# Patient Record
Sex: MALE | Race: WHITE | NOT HISPANIC OR LATINO
[De-identification: names, ages, dates, MRNs, and addresses within clinical notes are randomized per-mention and may not be internally consistent; named-entity substitution may affect disease eponyms.]

---

## 2022-03-09 PROBLEM — Z00.00 ENCOUNTER FOR PREVENTIVE HEALTH EXAMINATION: Status: ACTIVE | Noted: 2022-03-09

## 2022-03-15 NOTE — PHYSICAL EXAM
Rest and elevate the affected painful area as much as possible.    Apply ice for 15-20 minutes intermittently as needed and especially after any offending activity.   Use splint/crutches as directed     Anti-inflammatories/pain relievers like tylenol/acetaminophen or ibuprofen can be very helpful if not allergic to or contraindicated.     Follow up with Orthopedic specialist for further evaluation and treatment for ortho to be seen tomorrow        [Well Developed] : well developed [Well Nourished] : well nourished [No Acute Distress] : no acute distress [Normal Conjunctiva] : normal conjunctiva [Normal Venous Pressure] : normal venous pressure [5th Left ICS - MCL] : palpated at the 5th LICS in the midclavicular line [Normal] : normal [No Precordial Heave] : no precordial heave was noted [Normal Rate] : normal [Rhythm Regular] : regular [Normal S1] : normal S1 [Normal S2] : normal S2 [No Murmur] : no murmurs heard [No Pitting Edema] : no pitting edema present [2+] : left 2+ [Clear Lung Fields] : clear lung fields [Good Air Entry] : good air entry [No Respiratory Distress] : no respiratory distress  [Soft] : abdomen soft [Non Tender] : non-tender [Normal Bowel Sounds] : normal bowel sounds [Normal Gait] : normal gait [No Edema] : no edema [No Varicosities] : no varicosities [No Rash] : no rash [Moves all extremities] : moves all extremities [No Focal Deficits] : no focal deficits [Alert and Oriented] : alert and oriented

## 2022-03-16 ENCOUNTER — APPOINTMENT (OUTPATIENT)
Dept: CARDIOLOGY | Facility: CLINIC | Age: 32
End: 2022-03-16
Payer: COMMERCIAL

## 2022-03-16 VITALS — SYSTOLIC BLOOD PRESSURE: 118 MMHG | HEART RATE: 60 BPM | DIASTOLIC BLOOD PRESSURE: 66 MMHG

## 2022-03-16 VITALS — BODY MASS INDEX: 22.62 KG/M2 | WEIGHT: 158 LBS | HEIGHT: 70 IN | TEMPERATURE: 97.4 F

## 2022-03-16 DIAGNOSIS — R55 SYNCOPE AND COLLAPSE: ICD-10-CM

## 2022-03-16 DIAGNOSIS — Z87.891 PERSONAL HISTORY OF NICOTINE DEPENDENCE: ICD-10-CM

## 2022-03-16 DIAGNOSIS — R94.31 ABNORMAL ELECTROCARDIOGRAM [ECG] [EKG]: ICD-10-CM

## 2022-03-16 DIAGNOSIS — Z78.9 OTHER SPECIFIED HEALTH STATUS: ICD-10-CM

## 2022-03-16 DIAGNOSIS — Z82.49 FAMILY HISTORY OF ISCHEMIC HEART DISEASE AND OTHER DISEASES OF THE CIRCULATORY SYSTEM: ICD-10-CM

## 2022-03-16 PROCEDURE — 93000 ELECTROCARDIOGRAM COMPLETE: CPT

## 2022-03-16 PROCEDURE — 99202 OFFICE O/P NEW SF 15 MIN: CPT

## 2022-03-16 NOTE — REASON FOR VISIT
[Other: ____] : [unfilled] [FreeTextEntry1] : Diagnostic Tests:\par --------------------\par EKG:\par 03/16/22 NSR  with elevated J point in inferior leads.\par 03/08/22-NSR. J-point elevation in inferior leads.

## 2022-03-16 NOTE — ASSESSMENT
[FreeTextEntry1] : Syncope: Unclear cause. May be due to dehydration and electrolyte abnormality. \par -Will check stress echo vs TTE and ETT to evaluate for structural heart disease.\par -Will defer MCOT monitor at this time. \par \par BOB on EKG: J-point elevation, likely due to early repolarization\par -Stress echo vs stress and echo. \par -Labs reviewed with normal CRP, likely not due to MI/pericarditis. \par \par Follow up in 3 months.

## 2022-03-16 NOTE — REVIEW OF SYSTEMS
[Negative] : Heme/Lymph [SOB] : no shortness of breath [Dyspnea on exertion] : not dyspnea during exertion [Chest Discomfort] : no chest discomfort [Palpitations] : no palpitations [Syncope] : syncope

## 2022-03-16 NOTE — HISTORY OF PRESENT ILLNESS
[FreeTextEntry1] : Mr. Schwartz is a 33yo M with no PMH who presents to establish care. His PMD is Dr. Gerhard Nassar referred th [facility] ot cardiology due to abnormal findings on EKG (elevated J point  Patient reports passing out on Sunday 03/13/2022 while playing computer games, last thing he remembers is trying to stretch out his right leg due to a muscle spasm, recalls a very stressful week at work. He denies chest pain, SOB, dizziness, palpitations. He thinks he was dehydrated.

## 2022-04-15 ENCOUNTER — APPOINTMENT (OUTPATIENT)
Dept: CARDIOLOGY | Facility: CLINIC | Age: 32
End: 2022-04-15

## 2022-06-20 ENCOUNTER — APPOINTMENT (OUTPATIENT)
Dept: CARDIOLOGY | Facility: CLINIC | Age: 32
End: 2022-06-20

## 2022-11-21 ENCOUNTER — EMERGENCY (EMERGENCY)
Facility: HOSPITAL | Age: 32
LOS: 0 days | Discharge: HOME | End: 2022-11-21
Attending: EMERGENCY MEDICINE | Admitting: EMERGENCY MEDICINE

## 2022-11-21 VITALS — DIASTOLIC BLOOD PRESSURE: 76 MMHG | SYSTOLIC BLOOD PRESSURE: 129 MMHG | HEART RATE: 56 BPM | TEMPERATURE: 97 F

## 2022-11-21 VITALS
HEIGHT: 70 IN | WEIGHT: 169.98 LBS | RESPIRATION RATE: 22 BRPM | OXYGEN SATURATION: 99 % | SYSTOLIC BLOOD PRESSURE: 127 MMHG | HEART RATE: 87 BPM | TEMPERATURE: 96 F | DIASTOLIC BLOOD PRESSURE: 73 MMHG

## 2022-11-21 DIAGNOSIS — S91.205A UNSPECIFIED OPEN WOUND OF LEFT LESSER TOE(S) WITH DAMAGE TO NAIL, INITIAL ENCOUNTER: ICD-10-CM

## 2022-11-21 DIAGNOSIS — Z20.822 CONTACT WITH AND (SUSPECTED) EXPOSURE TO COVID-19: ICD-10-CM

## 2022-11-21 DIAGNOSIS — S92.812A OTHER FRACTURE OF LEFT FOOT, INITIAL ENCOUNTER FOR CLOSED FRACTURE: ICD-10-CM
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
## 2022-11-21 DIAGNOSIS — S90.222A CONTUSION OF LEFT LESSER TOE(S) WITH DAMAGE TO NAIL, INITIAL ENCOUNTER: ICD-10-CM

## 2022-11-21 DIAGNOSIS — S91.312A LACERATION WITHOUT FOREIGN BODY, LEFT FOOT, INITIAL ENCOUNTER: ICD-10-CM

## 2022-11-21 DIAGNOSIS — F17.290 NICOTINE DEPENDENCE, OTHER TOBACCO PRODUCT, UNCOMPLICATED: ICD-10-CM

## 2022-11-21 DIAGNOSIS — M25.531 PAIN IN RIGHT WRIST: ICD-10-CM
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
## 2022-11-21 DIAGNOSIS — S90.212A CONTUSION OF LEFT GREAT TOE WITH DAMAGE TO NAIL, INITIAL ENCOUNTER: ICD-10-CM

## 2022-11-21 LAB
ALBUMIN SERPL ELPH-MCNC: 5.1 G/DL — SIGNIFICANT CHANGE UP (ref 3.5–5.2)
ALP SERPL-CCNC: 60 U/L — SIGNIFICANT CHANGE UP (ref 30–115)
ALT FLD-CCNC: 16 U/L — SIGNIFICANT CHANGE UP (ref 0–41)
ANION GAP SERPL CALC-SCNC: 15 MMOL/L — HIGH (ref 7–14)
APTT BLD: 29 SEC — SIGNIFICANT CHANGE UP (ref 27–39.2)
AST SERPL-CCNC: 20 U/L — SIGNIFICANT CHANGE UP (ref 0–41)
BASOPHILS # BLD AUTO: 0.06 K/UL — SIGNIFICANT CHANGE UP (ref 0–0.2)
BASOPHILS NFR BLD AUTO: 0.6 % — SIGNIFICANT CHANGE UP (ref 0–1)
BILIRUB SERPL-MCNC: 0.7 MG/DL — SIGNIFICANT CHANGE UP (ref 0.2–1.2)
BLD GP AB SCN SERPL QL: SIGNIFICANT CHANGE UP
BUN SERPL-MCNC: 17 MG/DL — SIGNIFICANT CHANGE UP (ref 10–20)
CALCIUM SERPL-MCNC: 10 MG/DL — SIGNIFICANT CHANGE UP (ref 8.4–10.5)
CHLORIDE SERPL-SCNC: 101 MMOL/L — SIGNIFICANT CHANGE UP (ref 98–110)
CO2 SERPL-SCNC: 24 MMOL/L — SIGNIFICANT CHANGE UP (ref 17–32)
CREAT SERPL-MCNC: 0.9 MG/DL — SIGNIFICANT CHANGE UP (ref 0.7–1.5)
EGFR: 117 ML/MIN/1.73M2 — SIGNIFICANT CHANGE UP
EOSINOPHIL # BLD AUTO: 0.24 K/UL — SIGNIFICANT CHANGE UP (ref 0–0.7)
EOSINOPHIL NFR BLD AUTO: 2.6 % — SIGNIFICANT CHANGE UP (ref 0–8)
GLUCOSE SERPL-MCNC: 139 MG/DL — HIGH (ref 70–99)
HCT VFR BLD CALC: 45.9 % — SIGNIFICANT CHANGE UP (ref 42–52)
HGB BLD-MCNC: 16.1 G/DL — SIGNIFICANT CHANGE UP (ref 14–18)
IMM GRANULOCYTES NFR BLD AUTO: 0.2 % — SIGNIFICANT CHANGE UP (ref 0.1–0.3)
INR BLD: 0.93 RATIO — SIGNIFICANT CHANGE UP (ref 0.65–1.3)
LYMPHOCYTES # BLD AUTO: 3.55 K/UL — HIGH (ref 1.2–3.4)
LYMPHOCYTES # BLD AUTO: 37.7 % — SIGNIFICANT CHANGE UP (ref 20.5–51.1)
MCHC RBC-ENTMCNC: 31.2 PG — HIGH (ref 27–31)
MCHC RBC-ENTMCNC: 35.1 G/DL — SIGNIFICANT CHANGE UP (ref 32–37)
MCV RBC AUTO: 89 FL — SIGNIFICANT CHANGE UP (ref 80–94)
MONOCYTES # BLD AUTO: 0.62 K/UL — HIGH (ref 0.1–0.6)
MONOCYTES NFR BLD AUTO: 6.6 % — SIGNIFICANT CHANGE UP (ref 1.7–9.3)
NEUTROPHILS # BLD AUTO: 4.92 K/UL — SIGNIFICANT CHANGE UP (ref 1.4–6.5)
NEUTROPHILS NFR BLD AUTO: 52.3 % — SIGNIFICANT CHANGE UP (ref 42.2–75.2)
NRBC # BLD: 0 /100 WBCS — SIGNIFICANT CHANGE UP (ref 0–0)
PLATELET # BLD AUTO: 259 K/UL — SIGNIFICANT CHANGE UP (ref 130–400)
POTASSIUM SERPL-MCNC: 4.9 MMOL/L — SIGNIFICANT CHANGE UP (ref 3.5–5)
POTASSIUM SERPL-SCNC: 4.9 MMOL/L — SIGNIFICANT CHANGE UP (ref 3.5–5)
PROT SERPL-MCNC: 7 G/DL — SIGNIFICANT CHANGE UP (ref 6–8)
PROTHROM AB SERPL-ACNC: 10.6 SEC — SIGNIFICANT CHANGE UP (ref 9.95–12.87)
RBC # BLD: 5.16 M/UL — SIGNIFICANT CHANGE UP (ref 4.7–6.1)
RBC # FLD: 11.4 % — LOW (ref 11.5–14.5)
SARS-COV-2 RNA SPEC QL NAA+PROBE: SIGNIFICANT CHANGE UP
SODIUM SERPL-SCNC: 140 MMOL/L — SIGNIFICANT CHANGE UP (ref 135–146)
WBC # BLD: 9.41 K/UL — SIGNIFICANT CHANGE UP (ref 4.8–10.8)
WBC # FLD AUTO: 9.41 K/UL — SIGNIFICANT CHANGE UP (ref 4.8–10.8)

## 2022-11-21 PROCEDURE — 73610 X-RAY EXAM OF ANKLE: CPT | Mod: 26,LT

## 2022-11-21 PROCEDURE — 29515 APPLICATION SHORT LEG SPLINT: CPT | Mod: GC,LT,59

## 2022-11-21 PROCEDURE — 11730 AVULSION NAIL PLATE SIMPLE 1: CPT | Mod: GC,LT,59

## 2022-11-21 PROCEDURE — 11740 EVACUATION SUBUNGUAL HMTMA: CPT | Mod: GC,LT,59

## 2022-11-21 PROCEDURE — 99284 EMERGENCY DEPT VISIT MOD MDM: CPT | Mod: GC,25

## 2022-11-21 PROCEDURE — 73700 CT LOWER EXTREMITY W/O DYE: CPT | Mod: 26,LT,MA,76

## 2022-11-21 PROCEDURE — 73630 X-RAY EXAM OF FOOT: CPT | Mod: 26,LT

## 2022-11-21 PROCEDURE — 12042 INTMD RPR N-HF/GENIT2.6-7.5: CPT | Mod: LT,59

## 2022-11-21 PROCEDURE — 11042 DBRDMT SUBQ TIS 1ST 20SQCM/<: CPT | Mod: LT,59,GC

## 2022-11-21 PROCEDURE — 73110 X-RAY EXAM OF WRIST: CPT | Mod: 26,RT

## 2022-11-21 PROCEDURE — 99285 EMERGENCY DEPT VISIT HI MDM: CPT

## 2022-11-21 PROCEDURE — 99053 MED SERV 10PM-8AM 24 HR FAC: CPT

## 2022-11-21 RX ORDER — MORPHINE SULFATE 50 MG/1
4 CAPSULE, EXTENDED RELEASE ORAL ONCE
Refills: 0 | Status: DISCONTINUED | OUTPATIENT
Start: 2022-11-21 | End: 2022-11-21

## 2022-11-21 RX ORDER — SODIUM CHLORIDE 9 MG/ML
1000 INJECTION, SOLUTION INTRAVENOUS ONCE
Refills: 0 | Status: COMPLETED | OUTPATIENT
Start: 2022-11-21 | End: 2022-11-21

## 2022-11-21 RX ORDER — ONDANSETRON 8 MG/1
4 TABLET, FILM COATED ORAL ONCE
Refills: 0 | Status: COMPLETED | OUTPATIENT
Start: 2022-11-21 | End: 2022-11-21

## 2022-11-21 RX ORDER — OXYCODONE AND ACETAMINOPHEN 5; 325 MG/1; MG/1
1 TABLET ORAL
Qty: 12 | Refills: 0
Start: 2022-11-21 | End: 2022-11-23

## 2022-11-21 RX ORDER — CEFAZOLIN SODIUM 1 G
2000 VIAL (EA) INJECTION ONCE
Refills: 0 | Status: COMPLETED | OUTPATIENT
Start: 2022-11-21 | End: 2022-11-21

## 2022-11-21 RX ADMIN — MORPHINE SULFATE 4 MILLIGRAM(S): 50 CAPSULE, EXTENDED RELEASE ORAL at 08:00

## 2022-11-21 RX ADMIN — MORPHINE SULFATE 4 MILLIGRAM(S): 50 CAPSULE, EXTENDED RELEASE ORAL at 06:41

## 2022-11-21 RX ADMIN — MORPHINE SULFATE 4 MILLIGRAM(S): 50 CAPSULE, EXTENDED RELEASE ORAL at 10:10

## 2022-11-21 RX ADMIN — Medication 100 MILLIGRAM(S): at 06:52

## 2022-11-21 RX ADMIN — MORPHINE SULFATE 4 MILLIGRAM(S): 50 CAPSULE, EXTENDED RELEASE ORAL at 07:11

## 2022-11-21 RX ADMIN — MORPHINE SULFATE 4 MILLIGRAM(S): 50 CAPSULE, EXTENDED RELEASE ORAL at 07:28

## 2022-11-21 RX ADMIN — SODIUM CHLORIDE 1000 MILLILITER(S): 9 INJECTION, SOLUTION INTRAVENOUS at 06:41

## 2022-11-21 RX ADMIN — ONDANSETRON 4 MILLIGRAM(S): 8 TABLET, FILM COATED ORAL at 06:41

## 2022-11-21 NOTE — ED PROVIDER NOTE - PATIENT PORTAL LINK FT
You can access the FollowMyHealth Patient Portal offered by [facility] by registering at the following website: [email or web address]. By joining [employer]’s FollowMyHealth portal, you will also be able to view your health information using other applications (apps) compatible with our system.

## 2022-11-21 NOTE — CONSULT NOTE ADULT - SUBJECTIVE AND OBJECTIVE BOX
Podiatry Consult Note    Subjective:  CARLOSPHIL  Seen Bedside 32y Male  .   Patient is a 32y old  Male who presents with a chief complaint of     Podiatry:  32 year old male presents with left foot wound and laceration to medial side with active bleeding. Patient works at [employer] were his foot was ran over by a [employer]. Patient states he was wearing a boot with socks and did not remove his shoe until presenting to the ED. Patient is in moderate to severe pain. Denies pain out of proportion, denies paresthesia, and is able to perform ROM to ankle/foot/toes. Denies f/c/n/v/sob.       Past Medical History and Surgical History  PAST MEDICAL & SURGICAL HISTORY:       Review of Systems:  [X] Ten point review of systems is otherwise negative except as noted     Objective:  Vital Signs Last 24 Hrs  T(C): 35.4 (21 Nov 2022 06:18), Max: 35.4 (21 Nov 2022 06:18)  T(F): 95.8 (21 Nov 2022 06:18), Max: 95.8 (21 Nov 2022 06:18)  HR: 57 (21 Nov 2022 09:33) (55 - 87)  BP: 138/80 (21 Nov 2022 09:33) (125/60 - 138/80)  BP(mean): --  RR: 20 (21 Nov 2022 09:33) (19 - 22)  SpO2: 100% (21 Nov 2022 09:33) (99% - 100%)    Parameters below as of 21 Nov 2022 06:18  Patient On (Oxygen Delivery Method): room air                            16.1   9.41  )-----------( 259      ( 21 Nov 2022 07:20 )             45.9                 11-21    140  |  101  |  17  ----------------------------<  139<H>  4.9   |  24  |  0.9    Ca    10.0      21 Nov 2022 07:20    TPro  7.0  /  Alb  5.1  /  TBili  0.7  /  DBili  x   /  AST  20  /  ALT  16  /  AlkPhos  60  11-21        Physical Exam - Lower Extremity Focused:   Derm:    Left foot laceration at level of medial mid foot measuring approximately 6.5cm x 1.5cm x 1.2cm with active moderate bleeding/drainage. Clean wound margins. No evidence of pus, foreign bodies, or dirt.     Subungual hematoma to proximal medial nail fold of hallux nail left foot   Complete lysis of nail of left foot 3rd digit with mild bleeding.    Moderate ecchymosis noted to dorsal forefoot extending proximally to dorsal midfoot.     No evidence of pallor or cyanosis noted;    Vascular: DP and PT Pulses palpable; Foot is Warm to Warm to the touch; Capillary Refill Time < 3 Seconds;    Neuro: Protective Sensation Intact; No paraesthesia, no poikilothermia.  MSK:   Moderate to severe Pain On Palpation at Wound Site   guarded TTP to palpation of Left foot  Able to perform ROM to ankle, STJ, and toes with little to no pain.     Radiology:  CT-Left Foot/Ankle 11/21/22  Left ankle:  No additional fracture. No dislocation. Symmetric ankle mortise. No significant ankle joint effusion.    IMPRESSION:  1. Small avulsion fractures of the dorsal calcaneus and cuboid in the region of the dorsal calcaneocuboid ligament attachment consistent with midtarsal sprain/injury.  2. Small fracture of the anterior plantar calcaneus.  3. Fracture of the first distal phalangeal tuft.  4. Small avulsion fracture at the lateral base of the first proximal phalanx.  5. Subcutaneous gas consistent with open wound.      Assessment:  Laceration to Left foot; clean wound margins;   Multiple fractures sustained to left foot; stable; no displacement/no dislocations/no open fractures  Left foot 3rd digit nail avulsion; stable  Left foot 1st digit nail subungual hematoma; <25% of nail    Plan:  Chart reviewed and Patient evaluated. All Questions and Concerns Addressed and Answered  CT Imaging L  Foot; Results as stated above  Local Wound Care; Wound Flushed w/ 3000mL of a mix: NS, Betadine, H2O2, vashe wash until laceration appeared clean.   Ankle block injection: 17cc of Lidocaine 1% w/o epi;  Removal LF 3rd digit nail with sterile needle ;   Wound closed with 4-0 vicryl and 2-0 nylon;   Posterior splint applied: 4in fiber glass, webrill, Ace bandage  Weight Bearing Status; NWB w/Crutches and posterior splint 4-6 weeks minimum LLE;    Recommend PO abx course upon dsc;  Showers are okay but keep splint/dressings clean/dry/intact;   No surgical intervention indicated at this time;  Patient stable per Podiatry standpoint;  Patient may follow up as outpatient w/Dr. Seo at [address] [place] [address]. 1 week post-dsc;  Discussed plan w/ Attending Dr. Seo    Podiatry  Podiatry Consult Note    Subjective:  CARLOSPHIL  Seen Bedside 32y Male  .   Patient is a 32y old  Male who presents with a chief complaint of     Podiatry:  32 year old male presents with left foot wound and laceration to medial side with active bleeding. Patient works at [employer] were his foot was ran over by a [employer]. Patient states he was wearing a boot with socks and did not remove his shoe until presenting to the ED. Patient is in moderate to severe pain. Denies pain out of proportion, denies paresthesia, and is able to perform ROM to ankle/foot/toes. Denies f/c/n/v/sob.       Past Medical History and Surgical History  PAST MEDICAL & SURGICAL HISTORY:       Review of Systems:  [X] Ten point review of systems is otherwise negative except as noted     Objective:  Vital Signs Last 24 Hrs  T(C): 35.4 (21 Nov 2022 06:18), Max: 35.4 (21 Nov 2022 06:18)  T(F): 95.8 (21 Nov 2022 06:18), Max: 95.8 (21 Nov 2022 06:18)  HR: 57 (21 Nov 2022 09:33) (55 - 87)  BP: 138/80 (21 Nov 2022 09:33) (125/60 - 138/80)  BP(mean): --  RR: 20 (21 Nov 2022 09:33) (19 - 22)  SpO2: 100% (21 Nov 2022 09:33) (99% - 100%)    Parameters below as of 21 Nov 2022 06:18  Patient On (Oxygen Delivery Method): room air                            16.1   9.41  )-----------( 259      ( 21 Nov 2022 07:20 )             45.9                 11-21    140  |  101  |  17  ----------------------------<  139<H>  4.9   |  24  |  0.9    Ca    10.0      21 Nov 2022 07:20    TPro  7.0  /  Alb  5.1  /  TBili  0.7  /  DBili  x   /  AST  20  /  ALT  16  /  AlkPhos  60  11-21        Physical Exam - Lower Extremity Focused:   Derm:    Left foot laceration at level of medial mid foot measuring approximately 6.5cm x 1.5cm x 1.2cm with active moderate bleeding/drainage. Clean wound margins. No evidence of pus, foreign bodies, or dirt.     Subungual hematoma to proximal medial nail fold of hallux nail left foot   Complete lysis of nail of left foot 3rd digit with mild bleeding.    Moderate ecchymosis noted to dorsal forefoot extending proximally to dorsal midfoot.     No evidence of pallor or cyanosis noted;    Vascular: DP and PT Pulses palpable; Foot is Warm to Warm to the touch; Capillary Refill Time < 3 Seconds;    Neuro: Protective Sensation Intact; No paraesthesia, no poikilothermia.  MSK:   Moderate to severe Pain On Palpation at Wound Site   guarded TTP to palpation of Left foot  Able to perform ROM to ankle, STJ, and toes with little to no pain.     Radiology:  CT-Left Foot/Ankle 11/21/22  Left ankle:  No additional fracture. No dislocation. Symmetric ankle mortise. No significant ankle joint effusion.    IMPRESSION:  1. Small avulsion fractures of the dorsal calcaneus and cuboid in the region of the dorsal calcaneocuboid ligament attachment consistent with midtarsal sprain/injury.  2. Small fracture of the anterior plantar calcaneus.  3. Fracture of the first distal phalangeal tuft.  4. Small avulsion fracture at the lateral base of the first proximal phalanx.  5. Subcutaneous gas consistent with open wound.      Assessment:  Laceration to Left foot; clean wound margins;   Multiple fractures sustained to left foot; stable; no displacement/no dislocations/no open fractures  Extensive soft tissue injury to the L foot   Left foot 3rd digit nail loose with subungual hematoma   Left foot 1st digit nail subungual hematoma; <25% of nail    Plan:  Chart reviewed and Patient evaluated. All Questions and Concerns Addressed and Answered  CT Imaging L  Foot; Results as stated above  Compartment syndrome was ruled out   Local Wound Care; Wound Flushed w/ 3000mL of a mix: NS, Betadine, H2O2, vashe wash until laceration appeared clean.   Procedure explained to the patient. All risks, benefits, and complications explained to the patient. Pt agrees to the procedure .   Ankle block injection: 17cc of Lidocaine 1% w/o epi;  Removal LF 3rd digit nail with sterile needle - total nail avulsion   mild excisional dbx of the wound, non viable tissue,  down to the level of the subcutaneous tissue   Wound closed with 4-0 vicryl and 2-0 nylon;   Posterior splint applied: 4in fiber glass, webrill, Ace bandage  Weight Bearing Status; NWB w/Crutches and posterior splint 4-6 weeks minimum LLE;    Recommend PO abx course upon dsc;  Showers are okay but keep splint/dressings clean/dry/intact;   No surgical intervention indicated at this time;  Patient stable per Podiatry standpoint;  Patient may follow up as outpatient w/Dr. Seo at [address] [place] [address]. 1 week post-dsc;  Discussed plan w/ Attending Dr. Seo    Podiatry

## 2022-11-21 NOTE — ED PROVIDER NOTE - NSPTACCESSSVCSAPPTDETAILS_ED_ALL_ED_FT
Patient sustained crush injury to foot.  He has fractures to the foot.  There is an associated laceration.  He needs follow-up podiatry early next week.

## 2022-11-21 NOTE — CONSULT NOTE ADULT - ATTENDING COMMENTS
Assessment:  Laceration to Left foot; clean wound margins; mild non viable tissue.   Multiple fractures sustained to left foot; stable; no displacement/no dislocations/no open fractures  Left foot 3rd digit nail loose with subungual hematoma   Left foot 1st digit nail subungual hematoma; <25% of nail    Plan:  Chart reviewed and Patient evaluated. All Questions and Concerns Addressed and Answered  CT Imaging L  Foot; Results as stated above  Local Wound Care; Wound Flushed w/ 3000mL of a mix: NS, Betadine, H2O2, vashe wash until laceration appeared clean.   Procedure explained to the patient. All risks, benefits, and complications explained to the patient. Pt agrees to the procedure .   Ankle block injection: 17cc of Lidocaine 1% w/o epi;  Removal LF 3rd digit nail with sterile needle - total nail avulsion   mild excisional dbx of the wound, non viable tissue,  down to the level of the subcutaneous tissue   Wound closed with 4-0 vicryl and 2-0 nylon;   Posterior splint applied: 4in fiber glass, webrill, Ace bandage  Weight Bearing Status; NWB w/Crutches and posterior splint 4-6 weeks minimum LLE;    Recommend PO abx course upon dsc;  Showers are okay but keep splint/dressings clean/dry/intact;   No surgical intervention indicated at this time;  Patient stable per Podiatry standpoint;  Patient may follow up as outpatient w/Dr. Seo

## 2022-11-21 NOTE — CONSULT NOTE ADULT - TIME BILLING
Complexity of the injury, discussion with patient and his family regarding all treatment options, risks, complications, and benefits. explaiend in details the post procedure protocol. Length of treatment.

## 2022-11-21 NOTE — ED PROVIDER NOTE - OBJECTIVE STATEMENT
32-year-old male with no significant past medical history presents to the ED complaining of severe left foot pain.  Patient accidentally ran over his foot with a forklift.  Pain is throbbing, constant, nonradiating, worse with movement and minimally improved with rest.  Patient is up-to-date with tetanus.  He denies other complaints or areas of injury.  Tetanus is up to date. No numbness, weakness, fever, chills. 32-year-old male with no significant past medical history presents to the ED complaining of severe left foot pain and mild R wrist pain.  Patient accidentally ran over his foot with a forklift then fell forward onto his R wrist.  Pain is throbbing, constant, nonradiating, worse with movement and minimally improved with rest.  Patient is up-to-date with tetanus.  He denies other complaints or areas of injury.  Tetanus is up to date. No numbness, weakness, fever, chills.

## 2022-11-21 NOTE — ED PROVIDER NOTE - CLINICAL SUMMARY MEDICAL DECISION MAKING FREE TEXT BOX
Patient sustained a crush injury to his foot at work.  X-rays suggest fracture and ligamentous injury.  CAT scan confirms.  Podiatry consulted.  Patient seen by podiatry and fracture care and laceration care provided by them.  In my opinion, outpatient management is medically appropriate after discussing case with podiatry attending.

## 2022-11-21 NOTE — ED PROVIDER NOTE - PHYSICAL EXAMINATION
VITAL SIGNS: I have reviewed nursing notes and confirm.  CONSTITUTIONAL: 31 yo M laying on stretcher in moderate; in no acute distress.  SKIN: Skin exam is warm and dry, no acute rash.  HEAD: Normocephalic; atraumatic.  EYES: Conjunctiva and sclera clear.  ENT: No nasal discharge; airway clear.  CARD: S1, S2 normal; no murmurs, gallops, or rubs. Regular rate and rhythm.  RESP: No wheezes, rales or rhonchi. Speaking in full sentences.   EXT: (+) ecchymosis to dorsal aspect of L foot/toes with TTP and limited ROM 2/2 pain. No deformity. DP 2+. (+) 4 cm superficial laceration to medial plantar aspect of L foot without FB or active bleeding. Sensation intact. CR <2 seconds.   NEURO: Alert, oriented. Grossly unremarkable. No focal deficits. VITAL SIGNS: I have reviewed nursing notes and confirm.  CONSTITUTIONAL: 33 yo M laying on stretcher in moderate distress 2/2 pain  SKIN: Skin exam is warm and dry, no acute rash.  HEAD: Normocephalic; atraumatic.  EYES: Conjunctiva and sclera clear.  ENT: No nasal discharge; airway clear.  CARD: S1, S2 normal; no murmurs, gallops, or rubs. Regular rate and rhythm.  RESP: No wheezes, rales or rhonchi. Speaking in full sentences.   EXT: (+) ecchymosis to dorsal aspect of L foot/toes with TTP and limited ROM 2/2 pain. No deformity. DP 2+. (+) 4 cm superficial laceration to medial plantar aspect of L foot without FB or active bleeding. Sensation intact. CR <2 seconds.   NEURO: Alert, oriented. Grossly unremarkable. No focal deficits. VITAL SIGNS: I have reviewed nursing notes and confirm.  CONSTITUTIONAL: 33 yo M laying on stretcher in moderate distress 2/2 pain  SKIN: Skin exam is warm and dry, no acute rash.  HEAD: Normocephalic; atraumatic.  EYES: Conjunctiva and sclera clear.  ENT: No nasal discharge; airway clear.  CARD: S1, S2 normal; no murmurs, gallops, or rubs. Regular rate and rhythm.  RESP: No wheezes, rales or rhonchi. Speaking in full sentences.   EXT: (+) ecchymosis to dorsal aspect of L foot/toes with TTP and limited ROM 2/2 pain. No deformity. DP 2+. (+) 4 cm superficial laceration to medial plantar aspect of L foot without FB or active bleeding. (+) mild TTP to R wrist without deformity. FROM. Radial pulses 2+. Sensation intact. CR <2 seconds.   NEURO: Alert, oriented. Grossly unremarkable. No focal deficits.

## 2022-11-21 NOTE — ED ADULT NURSE NOTE - NSFALLRSKPASTHIST_ED_ALL_ED
Problem: Pain - Standard  Goal: Alleviation of pain or a reduction in pain to the patient’s comfort goal  Outcome: Progressing     Problem: Skin Integrity  Goal: Skin integrity is maintained or improved  Outcome: Progressing   The patient is Watcher - Medium risk of patient condition declining or worsening    Shift Goals  Clinical Goals: pain control  Patient Goals: pain control; sleep  Family Goals: control pain; allow rest    Progress made toward(s) clinical / shift goals:  yes   no

## 2022-11-21 NOTE — ED PROVIDER NOTE - ATTENDING APP SHARED VISIT CONTRIBUTION OF CARE
I personally evaluated the patient. I reviewed the Resident´s or Physician Assistant´s note (as assigned above), and agree with the findings and plan except as documented in my note.  32-year-old male, no past medical history, states he got run over by a forklift on his left foot.  No other injuries.  Exam shows alert patient in painful distress, lungs clear, abdomen soft nontender, ecchymotic distal left foot, large horizontal laceration medial aspect, 2+ dorsalis pedis pulse.  Will order labs, x-ray, Ancef, morphine and Zofran.

## 2022-11-21 NOTE — ED PROVIDER NOTE - NS ED ATTENDING STATEMENT MOD
This was a shared visit with the ARAVIND. I reviewed and verified the documentation and independently performed the documented:

## 2022-11-21 NOTE — ED PROVIDER NOTE - NSFOLLOWUPINSTRUCTIONS_ED_ALL_ED_FT
Keep splint on; do not bear weight on the foot.  Use crutches.  Finish antibiotics.    For pain, take 2 Tylenol and 2 Advil at the same time every 4 hours as needed.     Take Percocet as prescribed for breakthrough pain.  Follow-up with podiatry next week..    Return to the emergency department for increasing pain or fever.    Our Emergency Department Referral Coordinators will be reaching out ot you in the next 24-48 hours from 9:00am to 5:00pm (Monday to Friday) with a follow up appointment. Please expect a phone call from the hospital in that time frame. If you do not receive a call or if you have any questions or concerns, you can reach them at [phone] or [phone].

## 2022-11-21 NOTE — ED PROVIDER NOTE - NS ED ROS FT
Review of Systems  Constitutional:  No fever, chills.  Eyes:  No visual changes, eye pain, or discharge.  ENMT:  No hearing changes, pain, or discharge. No nasal congestion, discharge, or bleeding. No throat pain, swelling, or difficulty swallowing.  Cardiac:  No chest pain, palpitations, syncope, or edema.  Respiratory:  No dyspnea, cough. No hemoptysis.  GI:  No nausea, vomiting, diarrhea, or abdominal pain.   MS:  No back pain. (+) left foot pain  Skin:  No skin rash, pruritis, jaundice, or lesions.  Neuro:  No headache, dizziness, loss of sensation, or focal weakness.  No change in mental status.

## 2024-08-16 ENCOUNTER — EMERGENCY (EMERGENCY)
Facility: HOSPITAL | Age: 34
LOS: 0 days | Discharge: HOME | End: 2024-08-16
Attending: EMERGENCY MEDICINE
Payer: SELF-PAY

## 2024-08-16 VITALS
HEART RATE: 73 BPM | WEIGHT: 184.97 LBS | RESPIRATION RATE: 18 BRPM | OXYGEN SATURATION: 98 % | DIASTOLIC BLOOD PRESSURE: 80 MMHG | SYSTOLIC BLOOD PRESSURE: 127 MMHG | TEMPERATURE: 98 F

## 2024-08-16 PROCEDURE — 99282 EMERGENCY DEPT VISIT SF MDM: CPT | Mod: 25

## 2024-08-16 PROCEDURE — 99284 EMERGENCY DEPT VISIT MOD MDM: CPT

## 2024-08-16 PROCEDURE — 10060 I&D ABSCESS SIMPLE/SINGLE: CPT

## 2024-08-16 RX ORDER — DOXYCYCLINE 100 MG/1
1 CAPSULE ORAL
Qty: 14 | Refills: 0
Start: 2024-08-16 | End: 2024-08-22

## 2024-08-16 NOTE — ED PROVIDER NOTE - OBJECTIVE STATEMENT
34-year-old male presents to the ED for evaluation of abscess.  Patient with pimples on his chest 4 days ago has increased in size and now with pain and swelling.  No fevers no chills, no history of IV drug use.

## 2024-08-16 NOTE — ED PROVIDER NOTE - PHYSICAL EXAMINATION
CONST: Well appearing in NAD  MS: Normal ROM in all extremities. No midline spinal tenderness.  SKIN: 3x3 cm abscess overlying the l anterior chest wall.   NEURO: A&Ox3, No focal deficits. Strength 5/5 with no sensory deficits. Steady gait

## 2024-08-16 NOTE — ED PROVIDER NOTE - NSFOLLOWUPINSTRUCTIONS_ED_ALL_ED_FT
Abscess    Please take your prescribed antibiotic doxycycline 100 mg twice a day for the next week.  This antibiotic makes you more sensitive to the sun please make sure you use sunscreen whenever going outdoors, wear a hat and avoid unnecessary prolonged sun exposure.    An abscess is an infected area that contains a collection of pus and debris. It can occur in almost any part of the body and occurs when the tissue gets infection. Symptoms include a painful mass that is red, warm, tender that might break open and have drainage. If your health care provider gave you antibiotics make sure to take the full course and do not stop even if feeling better.     SEEK MEDICAL CARE IF YOU HAVE THE FOLLOWING SYMPTOMS: chills, fever, muscle aches, or red streaking from the area.

## 2024-08-16 NOTE — ED ADULT NURSE NOTE - NSFALLUNIVINTERV_ED_ALL_ED
Bed/Stretcher in lowest position, wheels locked, appropriate side rails in place/Call bell, personal items and telephone in reach/Instruct patient to call for assistance before getting out of bed/chair/stretcher/Non-slip footwear applied when patient is off stretcher/[place] to call system/Physically safe environment - no spills, clutter or unnecessary equipment/Purposeful proactive rounding/Room/bathroom lighting operational, light cord in reach

## 2024-08-16 NOTE — ED PROVIDER NOTE - CARE PROVIDER_API CALL
Trish Galindo.  Internal Medicine  [address] [place] [address]  Phone: [phone]  Fax: [phone]  Follow Up Time: Routine

## 2024-08-16 NOTE — ED PROVIDER NOTE - ATTENDING APP SHARED VISIT CONTRIBUTION OF CARE
Fluctuant area on chest consistent with subcutaneous abscess.  Bedside echo confirms fluid collection.  I&D performed.  Positive pus.  Oral antibiotics ordered.

## 2024-08-16 NOTE — ED PROVIDER NOTE - PATIENT PORTAL LINK FT
You can access the FollowMyHealth Patient Portal offered by [facility] by registering at the following website: [email or web address]. By joining [employer]’s FollowMyHealth portal, you will also be able to view your health information using other applications (apps) compatible with our system.